# Patient Record
Sex: MALE | Race: ASIAN | NOT HISPANIC OR LATINO
[De-identification: names, ages, dates, MRNs, and addresses within clinical notes are randomized per-mention and may not be internally consistent; named-entity substitution may affect disease eponyms.]

---

## 2022-06-07 ENCOUNTER — NON-APPOINTMENT (OUTPATIENT)
Age: 22
End: 2022-06-07

## 2022-07-21 ENCOUNTER — NON-APPOINTMENT (OUTPATIENT)
Age: 22
End: 2022-07-21

## 2023-09-17 ENCOUNTER — EMERGENCY (EMERGENCY)
Facility: HOSPITAL | Age: 23
LOS: 1 days | Discharge: HOME | End: 2023-09-17
Attending: EMERGENCY MEDICINE | Admitting: EMERGENCY MEDICINE
Payer: MEDICAID

## 2023-09-17 VITALS
SYSTOLIC BLOOD PRESSURE: 120 MMHG | RESPIRATION RATE: 18 BRPM | HEART RATE: 87 BPM | TEMPERATURE: 99 F | OXYGEN SATURATION: 98 % | DIASTOLIC BLOOD PRESSURE: 76 MMHG

## 2023-09-17 VITALS
WEIGHT: 171.96 LBS | SYSTOLIC BLOOD PRESSURE: 127 MMHG | TEMPERATURE: 99 F | RESPIRATION RATE: 19 BRPM | HEART RATE: 99 BPM | HEIGHT: 72 IN | DIASTOLIC BLOOD PRESSURE: 75 MMHG | OXYGEN SATURATION: 98 %

## 2023-09-17 PROCEDURE — 99284 EMERGENCY DEPT VISIT MOD MDM: CPT | Mod: 57

## 2023-09-17 PROCEDURE — 99283 EMERGENCY DEPT VISIT LOW MDM: CPT

## 2023-09-17 PROCEDURE — 25560 CLTX RDL&ULN SHFT FX WO MNPJ: CPT | Mod: 54,RT

## 2023-09-17 PROCEDURE — 73090 X-RAY EXAM OF FOREARM: CPT

## 2023-09-17 PROCEDURE — 29125 APPL SHORT ARM SPLINT STATIC: CPT | Mod: RT

## 2023-09-17 PROCEDURE — 73090 X-RAY EXAM OF FOREARM: CPT | Mod: 26,LT

## 2023-09-17 RX ORDER — IBUPROFEN 200 MG
1 TABLET ORAL
Qty: 12 | Refills: 0
Start: 2023-09-17

## 2023-09-17 RX ORDER — IBUPROFEN 200 MG
600 TABLET ORAL ONCE
Refills: 0 | Status: COMPLETED | OUTPATIENT
Start: 2023-09-17 | End: 2023-09-17

## 2023-09-17 RX ADMIN — Medication 600 MILLIGRAM(S): at 03:20

## 2023-09-17 RX ADMIN — Medication 600 MILLIGRAM(S): at 03:06

## 2023-09-17 NOTE — ED PROVIDER NOTE - CLINICAL SUMMARY MEDICAL DECISION MAKING FREE TEXT BOX
22 yo M with R forearm pain s/p fall. R/o fracture. Will get XR. Pain management with ibuprofen. Reassess.

## 2023-09-17 NOTE — ED PROVIDER NOTE - CARE PROVIDER_API CALL
Eleno Granger  Orthopaedic Surgery  [address], [address]  [place] [address]  Phone: [phone]  Fax: [phone]  Follow Up Time: 1-3 Days

## 2023-09-17 NOTE — ED PROVIDER NOTE - OBJECTIVE STATEMENT
89 yo M with no medical hx present with pain to R forearm started after he tripped over a toy on the step and fell 2 steps, breaking his fall with his R forearm hitting the railing. Denies any other injuries. Denies motor or sensory changes to right hand. Denies shoulder or elbow pain.

## 2023-09-17 NOTE — ED PROVIDER NOTE - MUSCULOSKELETAL MINIMAL EXAM
+TTP mid R forearm, no deformity. Normal motor and sensory exam to R hand. No tenderness at the wrist, hand, elbow, humerus or shoulder.

## 2023-09-17 NOTE — ED PROVIDER NOTE - CPE EDP CARDIAC NORM
Symptoms improving and patient tolerating PO, recommend zantac or tums for stomach pains with eating, could be from GERD  Return to work letter done    RTO for interview and PN1
normal...

## 2023-09-17 NOTE — ED ADULT TRIAGE NOTE - BP NONINVASIVE DIASTOLIC (MM HG)
75
If you are a smoker, it is important for your health to stop smoking. Please be aware that second hand smoke is also harmful.

## 2023-09-17 NOTE — ED ADULT NURSE NOTE - NSFALLRISKINTERV_ED_ALL_ED

## 2023-09-17 NOTE — ED PROVIDER NOTE - PATIENT PORTAL LINK FT
You can access the FollowMyHealth Patient Portal offered by [facility] by registering at the following website: [email or web address]. By joining [facility]’s FollowMyHealth portal, you will also be able to view your health information using other applications (apps) compatible with our system.

## 2023-09-17 NOTE — ED PROVIDER NOTE - NSFOLLOWUPINSTRUCTIONS_ED_ALL_ED_FT
No weight bearing with R arm  Follow up with Orthopedics next week  You may take Ibuprofen 600 mg every 6 hrs as needed for pain    Ulnar Fracture  Bones of the arm and hand featuring the radius and ulna. There is a break, or fracture, in the ulna.  An ulnar fracture is a break in the ulna bone. The ulna is a bone in the forearm, on the same side as the little finger. The forearm is the part of the arm that is between the elbow and the wrist. It is made up of two bones: the radius and the ulna. You can feel the ulna on the outside of the wrist and at the point of the elbow.    An ulnar fracture can happen near the wrist, near the elbow, or in the middle of the forearm. In many cases of ulnar fracture, the radius is also fractured.    What are the causes?  This condition is usually caused by a direct hit or stress to the forearm. This may result from:  An accident, such as a car or bike accident.  Falling with the arm outstretched.  What increases the risk?  You may be more likely to fracture your ulna if you:  Play contact sports.  Have a condition that causes your bones to become thin and brittle (osteoporosis).  What are the signs or symptoms?  Signs and symptoms may include:  Pain immediately after the injury.  An abnormal bend or bump in the arm (deformity).  Swelling.  Bruising.  Numbness or weakness in your hand.  Inability to turn your hand from side to side.  How is this diagnosed?  This condition may be diagnosed based on:  Your symptoms and medical history.  A physical exam.  An X-ray.  How is this treated?  Treatment depends on how severe your fracture is, where it is, and how the pieces of the broken bone line up with each other (alignment).  The first step in treatment may be for you to wear a temporary splint for a few days. After the swelling goes down, you may get a cast, get a different type of splint, or have surgery.  If your broken bone is in good alignment, you will need to wear a splint or cast for several weeks.  If your broken bone is not aligned (is displaced), your health care provider will need to align the bone pieces. After alignment, you will need to wear a splint or cast for up to 6 weeks. To align your broken bone, your health care provider may:  Move the bones back into position without surgery (closed reduction).  Perform surgery to align the fracture and fix the bone pieces into place with metal screws, plates, or wires (open reduction and internal fixation, ORIF).  Perform surgery to align the fracture and fix the bone pieces into place with pins that are attached to a stabilizing bar outside your skin (external fixation).  Treatment may also include:  Having your cast changed after 2–3 weeks.  Physical therapy.  Follow-up visits and X-rays to make sure you are healing.  Follow these instructions at home:  If you have a splint:    Wear it as told by your health care provider. Remove it only as told by your health care provider.  Loosen the splint if your fingers tingle, become numb, or turn cold and blue.  Keep the splint clean and dry.  If you have a cast:    Do not stick anything inside the cast to scratch your skin. Doing that increases your risk for infection.  Check the skin around the cast every day. Tell your health care provider about any concerns.  You may put lotion on dry skin around the edges of the cast. Do not put lotion on the skin underneath the cast.  Keep the cast clean and dry.  Bathing    Do not take baths, swim, or use a hot tub until your health care provider approves. Ask your health care provider if you may take showers. You may only be allowed to take sponge baths.  If your splint or cast is not waterproof:  Do not let it get wet.  Cover it with a watertight covering when you take a bath or a shower.  Activity    Do not lift anything with your injured arm.  Do not use the injured arm to support your body weight until your health care provider says that you can.  Ask your health care provider what activities are safe for you during recovery, and ask what activities you need to avoid.  Managing pain, stiffness, and swelling    Bag of ice on a towel on the skin.  If directed, put ice on painful areas:  If you have a removable splint, remove it as told by your health care provider.  Put ice in a plastic bag.  Place a towel between your skin and the bag, or between your cast and the bag.  Leave the ice on for 20 minutes, 2–3 times a day.  Move your fingers often to avoid stiffness and to lessen swelling.  Raise (elevate) the injured area above the level of your heart while you are sitting or lying down.  General instructions    Do not put pressure on any part of the cast or splint until it is fully hardened. This may take several hours.  Take over-the-counter and prescription medicines only as told by your health care provider.  Do not drive until your health care provider approves. You should not drive or use heavy machinery while taking prescription pain medicine.  Do not use any products that contain nicotine or tobacco, such as cigarettes and e-cigarettes. These can delay bone healing. If you need help quitting, ask your health care provider.  Keep all follow-up visits as told by your health care provider. This is important.  Contact a health care provider if you have:  Pain that does not get better with medicine.  Swelling that gets worse.  A bad smell coming from your cast.  Get help right away if:  You cannot move your fingers.  You have severe pain.  Your fingers or your hand:  Become numb, cold, or pale.  Turn a bluish color.  Summary  An ulnar fracture is a break in the ulna bone, which is the bone in your forearm that is on the same side as your little finger.  You may need to wear a splint or a cast for at least several weeks. Sometimes surgery is needed.  Keep all follow-up visits as told by your health care provider.  This information is not intended to replace advice given to you by your health care provider. Make sure you discuss any questions you have with your health care provider.

## 2023-09-18 PROBLEM — Z78.9 OTHER SPECIFIED HEALTH STATUS: Chronic | Status: ACTIVE | Noted: 2023-09-17

## 2023-09-20 PROBLEM — Z00.00 ENCOUNTER FOR PREVENTIVE HEALTH EXAMINATION: Status: ACTIVE | Noted: 2023-09-20

## 2023-09-22 ENCOUNTER — APPOINTMENT (OUTPATIENT)
Dept: ORTHOPEDIC SURGERY | Facility: CLINIC | Age: 23
End: 2023-09-22
